# Patient Record
Sex: FEMALE | Race: BLACK OR AFRICAN AMERICAN | Employment: UNEMPLOYED | URBAN - METROPOLITAN AREA
[De-identification: names, ages, dates, MRNs, and addresses within clinical notes are randomized per-mention and may not be internally consistent; named-entity substitution may affect disease eponyms.]

---

## 2024-03-15 ENCOUNTER — TELEPHONE (OUTPATIENT)
Age: 19
End: 2024-03-15

## 2024-03-15 NOTE — TELEPHONE ENCOUNTER
Patient states she is looking for an oral surgeon and was told to call her insurance. Explained that she called the doctors office. She said she was trying to call her insurance but didn't know which number to try. No help needed.